# Patient Record
Sex: MALE | Race: WHITE | NOT HISPANIC OR LATINO | RURAL
[De-identification: names, ages, dates, MRNs, and addresses within clinical notes are randomized per-mention and may not be internally consistent; named-entity substitution may affect disease eponyms.]

---

## 2024-10-21 ENCOUNTER — HOSPITAL ENCOUNTER (EMERGENCY)
Facility: HOSPITAL | Age: 59
Discharge: HOME | End: 2024-10-21
Attending: EMERGENCY MEDICINE

## 2024-10-21 VITALS
DIASTOLIC BLOOD PRESSURE: 80 MMHG | HEART RATE: 55 BPM | SYSTOLIC BLOOD PRESSURE: 128 MMHG | WEIGHT: 164.88 LBS | HEIGHT: 72 IN | OXYGEN SATURATION: 99 % | TEMPERATURE: 98 F | RESPIRATION RATE: 17 BRPM | BODY MASS INDEX: 22.33 KG/M2

## 2024-10-21 DIAGNOSIS — M79.644 PAIN OF RIGHT THUMB: ICD-10-CM

## 2024-10-21 DIAGNOSIS — M19.90 ARTHRITIS: Primary | ICD-10-CM

## 2024-10-21 LAB
ERYTHROCYTE [SEDIMENTATION RATE] IN BLOOD BY WESTERGREN METHOD: 13 MM/HR (ref 0–20)
URATE SERPL-MCNC: 5.7 MG/DL (ref 3.5–7.2)

## 2024-10-21 PROCEDURE — 84550 ASSAY OF BLOOD/URIC ACID: CPT | Performed by: EMERGENCY MEDICINE

## 2024-10-21 PROCEDURE — 85651 RBC SED RATE NONAUTOMATED: CPT | Performed by: EMERGENCY MEDICINE

## 2024-10-21 PROCEDURE — 99284 EMERGENCY DEPT VISIT MOD MDM: CPT | Mod: ,,, | Performed by: EMERGENCY MEDICINE

## 2024-10-21 PROCEDURE — 99284 EMERGENCY DEPT VISIT MOD MDM: CPT | Mod: 25

## 2024-10-21 PROCEDURE — 36415 COLL VENOUS BLD VENIPUNCTURE: CPT | Performed by: EMERGENCY MEDICINE

## 2024-10-21 RX ORDER — ASPIRIN 81 MG/1
81 TABLET ORAL DAILY
COMMUNITY

## 2024-10-21 RX ORDER — CARVEDILOL 3.12 MG/1
3.12 TABLET ORAL 2 TIMES DAILY
COMMUNITY
Start: 2024-09-21

## 2024-10-21 RX ORDER — ATORVASTATIN CALCIUM 20 MG/1
20 TABLET, FILM COATED ORAL
COMMUNITY
Start: 2024-08-17

## 2024-10-21 RX ORDER — CLOPIDOGREL BISULFATE 75 MG/1
75 TABLET ORAL
COMMUNITY
Start: 2024-09-06

## 2024-10-21 RX ORDER — METHYLPREDNISOLONE 4 MG/1
TABLET ORAL
Qty: 21 EACH | Refills: 0 | Status: SHIPPED | OUTPATIENT
Start: 2024-10-21 | End: 2024-11-11

## 2024-10-21 RX ORDER — SPIRONOLACTONE 25 MG/1
12.5 TABLET ORAL
COMMUNITY
Start: 2024-10-05

## 2024-10-21 NOTE — DISCHARGE INSTRUCTIONS
ELEVATE  ALTERNATE ICE WITH HEAT AS TOLERATED  MEDICATIONS AS DIRECTED  MEDROL DOSE PACK  OVER THE COUNTER   TYLENOL, PEPCID FOR HEART BURN  LIMIT USE IF PAINFUL OF R THUMB  STOP SMOKING

## 2024-10-21 NOTE — ED PROVIDER NOTES
Encounter Date: 10/21/2024       History     Chief Complaint   Patient presents with    Joint Pain    Joint Swelling     C/O pain and swelling to base of right thumb onset this am, history of similar 2 weeks ago and resolved with ice and motrin po. Denies any known injury     PT IS A 59 YR OLD WM WITH 2 WEEK HX R THUMB PAIN, SWELLING INITIALLY RELATED TO USING TOOLS, PAIN CEASED WITH NSAID AND ICE THEN RECURRED LAST PM.  PT DENIES HX GOUT OR OTHER TRAUMA EXCEPT THAT AS ABOVE USING TOOLS.    PMH  Diagnosis Date Comments  Coronary artery disease [I25.10]      Past Surgical History   Procedure Laterality Date Age Comment Src.  MOUTH SURGERY       CORONARY ANGIOPLASTY WITH STENT PLACEMENT                         The history is provided by the patient.     Review of patient's allergies indicates:  No Known Allergies  Past Medical History:   Diagnosis Date    Coronary artery disease      Past Surgical History:   Procedure Laterality Date    CORONARY ANGIOPLASTY WITH STENT PLACEMENT      MOUTH SURGERY       No family history on file.  Social History     Tobacco Use    Smoking status: Every Day     Current packs/day: 2.00     Average packs/day: 2.0 packs/day for 41.8 years (83.6 ttl pk-yrs)     Types: Cigarettes     Start date: 1983     Passive exposure: Never    Smokeless tobacco: Never   Substance Use Topics    Alcohol use: Never    Drug use: Yes     Types: Marijuana     Review of Systems   Constitutional:  Negative for fever.   HENT:  Negative for sore throat.    Respiratory:  Negative for shortness of breath.    Cardiovascular:  Negative for chest pain.   Gastrointestinal:  Negative for nausea.   Genitourinary:  Negative for dysuria.   Musculoskeletal:  Positive for arthralgias. Negative for back pain.   Skin:  Negative for rash.   Neurological:  Negative for weakness.   Hematological:  Does not bruise/bleed easily.   All other systems reviewed and are negative.      Physical Exam     Initial Vitals [10/21/24 0900]   BP  Pulse Resp Temp SpO2   (!) 133/99 63 18 97.5 °F (36.4 °C) 98 %      MAP       --         Physical Exam    Nursing note and vitals reviewed.  Constitutional: He appears well-developed and well-nourished. He is cooperative. He appears distressed.   HENT:   Head: Normocephalic and atraumatic.   Eyes: Conjunctivae and EOM are normal. Pupils are equal, round, and reactive to light.   Neck: Trachea normal. Neck supple.   Normal range of motion.  Cardiovascular:  Normal rate, regular rhythm, normal heart sounds and intact distal pulses.           Pulses:       Radial pulses are 3+ on the right side and 3+ on the left side.        Dorsalis pedis pulses are 3+ on the right side and 3+ on the left side.   Pulmonary/Chest: Breath sounds normal. No respiratory distress. He has no wheezes.   Abdominal: Abdomen is soft. Bowel sounds are normal. There is no abdominal tenderness.   Musculoskeletal:         General: Tenderness (R THUMB DIFFUSELY) and edema (MINIMAL R THUMB BASE) present. Normal range of motion.      Right shoulder: Normal.      Left shoulder: Normal.      Right upper arm: Normal.      Left upper arm: Normal.      Right elbow: Normal.      Left elbow: Normal.      Right forearm: Normal.      Left forearm: Normal.      Right wrist: Normal.      Left wrist: Normal.      Right hand: Normal.      Left hand: Normal.      Cervical back: Normal range of motion and neck supple.      Comments: NO ERYTHEMA OR POINT TENDERNESS     Lymphadenopathy:     He has no cervical adenopathy.     He has no axillary adenopathy.   Neurological: He is alert and oriented to person, place, and time. He has normal strength. No cranial nerve deficit or sensory deficit. He displays a negative Romberg sign. GCS eye subscore is 4. GCS verbal subscore is 5. GCS motor subscore is 6.   Reflex Scores:       Brachioradialis reflexes are 2+ on the right side and 2+ on the left side.       Patellar reflexes are 2+ on the right side and 2+ on the left  side.  Skin: Skin is warm and dry. Capillary refill takes less than 2 seconds. No erythema.   Psychiatric: He has a normal mood and affect. His speech is normal and behavior is normal. Judgment and thought content normal. Cognition and memory are normal.         Medical Screening Exam   See Full Note    ED Course   Procedures  Labs Reviewed   SEDIMENTATION RATE, AUTOMATED - Normal       Result Value    ESR Westergren 13     URIC ACID - Normal    Uric Acid 5.7            Imaging Results              X-Ray Finger 2 or More Views Right (Final result)  Result time 10/21/24 09:54:03      Final result by Rad Yang MD (10/21/24 09:54:03)                   Impression:      No convincing evidence of acute fracture or dislocation.      Electronically signed by: Rad Yang  Date:    10/21/2024  Time:    09:54               Narrative:    EXAMINATION:  XR FINGER 2 OR MORE VIEWS RIGHT    CLINICAL HISTORY:  R THUMB PAIN;    TECHNIQUE:  Three views right thumb.    COMPARISON:  None    FINDINGS:  No convincing evidence of acute fracture or dislocation.  Joint spaces are maintained.  Moderate DJD of the thumb CMC joint.  No definite radiopaque foreign body.                                    X-Rays:   Independently Interpreted Readings:   Other Readings:    FINDINGS:  No convincing evidence of acute fracture or dislocation.  Joint spaces are maintained.  Moderate DJD of the thumb CMC joint.  No definite radiopaque foreign body.     Impression:     No convincing evidence of acute fracture or dislocation.        Electronically signed by:Rad Yang  Date:                                            10/21/2024  Time:                                           09:54      Medications - No data to display  Medical Decision Making  PT IS A 59 YR OLD WM WITH 2 WEEK HX R THUMB PAIN, SWELLING INITIALLY RELATED TO USING TOOLS, PAIN CEASED WITH NSAID AND ICE THEN RECURRED LAST PM.  PT DENIES HX GOUT OR OTHER TRAUMA EXCEPT THAT AS  ABOVE USING TOOLS.    PMH  Diagnosis Date Comments  Coronary artery disease [I25.10]      Past Surgical History   Procedure Laterality Date Age Comment Src.  MOUTH SURGERY       CORONARY ANGIOPLASTY WITH STENT PLACEMENT                   Amount and/or Complexity of Data Reviewed  Labs: ordered.     Details:      Updated   Order   10/21/24 1038  Sedimentation Rate  Collected: 10/21/24 0932  Final result  Specimen: Blood      ESR Westergren 13 mm/Hr       10/21/24 0945  Uric Acid  Collected: 10/21/24 0932  Final result  Specimen: Blood      Uric Acid 5.7 mg/dL              Radiology: ordered.     Details:   FINDINGS:  No convincing evidence of acute fracture or dislocation.  Joint spaces are maintained.  Moderate DJD of the thumb CMC joint.  No definite radiopaque foreign body.     Impression:     No convincing evidence of acute fracture or dislocation.        Electronically signed by:Rad Yang  Date:                                            10/21/2024  Time:                                           09:54    Discussion of management or test interpretation with external provider(s): EXAM  LABS NEG URIC ACID 5.7 AND SED RATE 13  XRAY MODERATE ARTHRITIC CHANGES R THUMB  DC HOME IN STABLE CONDITION    Risk  Prescription drug management.                                      Clinical Impression:   Final diagnoses:  [M19.90] Arthritis (Primary)  [M79.644] Pain of right thumb        ED Disposition Condition    Discharge Stable          ED Prescriptions       Medication Sig Dispense Start Date End Date Auth. Provider    methylPREDNISolone (MEDROL DOSEPACK) 4 mg tablet use as directed 21 each 10/21/2024 11/11/2024 Ann Monroy MD          Follow-up Information       Follow up With Specialties Details Why Contact Info    Marina Cisse MD Family Medicine In 1 week  [address]  [facility] [place] [address]  [phone]               Ann Monroy MD  10/21/24 [id number]

## 2024-10-23 ENCOUNTER — TELEPHONE (OUTPATIENT)
Dept: EMERGENCY MEDICINE | Facility: HOSPITAL | Age: 59
End: 2024-10-23

## 2025-01-24 ENCOUNTER — HOSPITAL ENCOUNTER (EMERGENCY)
Facility: HOSPITAL | Age: 60
Discharge: HOME | End: 2025-01-24

## 2025-01-24 VITALS
SYSTOLIC BLOOD PRESSURE: 154 MMHG | HEART RATE: 63 BPM | BODY MASS INDEX: 22.96 KG/M2 | HEIGHT: 69 IN | OXYGEN SATURATION: 98 % | DIASTOLIC BLOOD PRESSURE: 91 MMHG | WEIGHT: 155 LBS | TEMPERATURE: 98 F | RESPIRATION RATE: 18 BRPM

## 2025-01-24 DIAGNOSIS — M79.645 PAIN OF LEFT THUMB: Primary | ICD-10-CM

## 2025-01-24 DIAGNOSIS — M19.042 OSTEOARTHRITIS OF LEFT HAND, UNSPECIFIED OSTEOARTHRITIS TYPE: ICD-10-CM

## 2025-01-24 PROCEDURE — 99284 EMERGENCY DEPT VISIT MOD MDM: CPT | Mod: ,,,

## 2025-01-24 PROCEDURE — 99283 EMERGENCY DEPT VISIT LOW MDM: CPT | Mod: 25

## 2025-01-24 RX ORDER — METHYLPREDNISOLONE 4 MG/1
TABLET ORAL
Qty: 21 EACH | Refills: 0 | Status: SHIPPED | OUTPATIENT
Start: 2025-01-24 | End: 2025-02-14

## 2025-01-24 NOTE — ED PROVIDER NOTES
Encounter Date: 1/24/2025       History     Chief Complaint   Patient presents with    Hand Pain     Left hand pain/swelling since last night     59-year-old white male presents to ED complaining of left hand pain and swelling that started Monday 1/20/25 that has worsened.  Denies any known injury or trauma to the area.  He works as a .  Took ibuprofen 4 pills prior to arrival.  States he has tried ice as well with minimal relief. Denies fever, chills, SOB, CP, abd pain, N/V/D, or dysuria. PMH includes CAD and HPTN, Surgical history includes coronary angioplasty with stent placement.         The history is provided by the patient.     Review of patient's allergies indicates:  No Known Allergies  Past Medical History:   Diagnosis Date    Coronary artery disease      Past Surgical History:   Procedure Laterality Date    CORONARY ANGIOPLASTY WITH STENT PLACEMENT      MOUTH SURGERY       No family history on file.  Social History     Tobacco Use    Smoking status: Every Day     Current packs/day: 2.00     Average packs/day: 2.0 packs/day for 42.1 years (84.1 ttl pk-yrs)     Types: Cigarettes     Start date: 1983     Passive exposure: Never    Smokeless tobacco: Never   Substance Use Topics    Alcohol use: Never    Drug use: Yes     Types: Marijuana     Review of Systems   Constitutional:  Negative for chills and fever.   HENT:  Negative for congestion, rhinorrhea and sore throat.    Eyes:  Negative for visual disturbance.   Cardiovascular:  Negative for chest pain and palpitations.   Gastrointestinal:  Negative for abdominal pain, constipation, diarrhea, nausea and vomiting.   Genitourinary:  Negative for dysuria, flank pain, frequency and hematuria.   Musculoskeletal:  Negative for neck pain and neck stiffness.   Skin:  Negative for rash and wound.   Neurological:  Negative for weakness and headaches.   Psychiatric/Behavioral:  Negative for suicidal ideas.        Physical Exam     Initial Vitals [01/24/25 1143]    BP Pulse Resp Temp SpO2   (!) 154/91 63 18 97.9 °F (36.6 °C) 98 %      MAP       --         Physical Exam    Nursing note and vitals reviewed.  Constitutional: He appears well-developed and well-nourished. No distress.   HENT:   Head: Normocephalic.   Right Ear: External ear normal.   Left Ear: External ear normal. Mouth/Throat: Oropharynx is clear and moist. No oropharyngeal exudate.   Eyes: Conjunctivae and EOM are normal. Pupils are equal, round, and reactive to light. No scleral icterus.   Neck:   Normal range of motion.  Cardiovascular:  Normal rate, regular rhythm, normal heart sounds and intact distal pulses.           Pulmonary/Chest: Breath sounds normal. No respiratory distress. He has no wheezes.   Abdominal: Abdomen is soft. Bowel sounds are normal. He exhibits no distension. There is no abdominal tenderness.   Musculoskeletal:         General: Tenderness and edema present.      Cervical back: Normal range of motion.      Comments: Left thumb swelling (thenar) and pain; neurovascularly intact; pain worse with active and passive ROM     Lymphadenopathy:     He has no cervical adenopathy.   Neurological: He is alert and oriented to person, place, and time. He has normal strength. GCS score is 15. GCS eye subscore is 4. GCS verbal subscore is 5. GCS motor subscore is 6.   Skin: Skin is warm and dry. Capillary refill takes less than 2 seconds.   Psychiatric: He has a normal mood and affect.         Medical Screening Exam   See Full Note    ED Course   Procedures  Labs Reviewed - No data to display       Imaging Results              X-Ray Hand 3 view Left (Final result)  Result time 01/24/25 13:03:40      Final result by Albino Batista MD (01/24/25 13:03:40)                   Impression:      1.  Small (0.4 cm) irregular ossific density at lateral margin of trapezium without a visible fracture donor site and could represent an acute displaced acute avulsion fracture, chronic ununited displaced avulsion  fracture or degenerative soft tissue calcification and can be correlated with directed clinical exam.    2.  No dislocation or osseous destruction.    3.  Osteoarthritis:    *Moderate changes at trapezium-1st metacarpal joint with moderate joint narrowing and small marginal osteophytes.  *Mild osteoarthritis at DIP joints of fingers with mild joint narrowing and small marginal osteophytes.  4.  No significant soft tissue swelling identified.      Electronically signed by: Albino Batista  Date:    01/24/2025  Time:    13:03               Narrative:    EXAMINATION:  XR HAND COMPLETE, 3 VIEWS, LEFT    CLINICAL HISTORY:  Pain and swelling.    TECHNIQUE:  PA, lateral, oblique views of left hand obtained.    COMPARISON:  No past imaging of left hand at this time.                                       Medications - No data to display  Medical Decision Making  Isaac Sandra has no evidence of an open wound; fracture; dislocation; retained foreign body; nerve or vascular injury; compartment syndrome; septic joint or other infection.    In the presence of no known injury, his clinical presentation presents as arthritis and his xray is positive for osteoarthritis at the site for pain. He declined any injections and requested po steroid treatment that worked for osteoarthritis of his right thumb from previous visit.    Data Reviewed/Counseling: I have reviewed the patient's vital signs, nursing notes, and other relevant tests/information. I had a detailed discussion regarding the historical points, exam findings, and any diagnostic results supporting the discharge diagnosis. I also discussed the need for outpatient follow-up and the need to return to the ED if symptoms worsen or if there are any questions or concerns that arise at home.     Amount and/or Complexity of Data Reviewed  Independent Historian:      Details: 59-year-old white male presents to ED complaining of left hand pain and swelling that started Monday 1/20/25 that  has worsened.  Denies any known injury or trauma to the area.  He works as a .  Took ibuprofen 4 pills prior to arrival.  States he has tried ice as well with minimal relief. Denies fever, chills, SOB, CP, abd pain, N/V/D, or dysuria. PMH includes CAD and HPTN, Surgical history includes coronary angioplasty with stent placement.     Radiology: ordered.     Details: X-ray right hand three-view-  Small (0.4 cm) irregular ossific density at lateral margin of trapezium without a visible fracture donor site and could represent an acute displaced acute avulsion fracture, chronic ununited displaced avulsion fracture or degenerative soft tissue calcification and can be correlated with directed clinical exam. No dislocation or osseous destruction. Osteoarthritis: Moderate changes at trapezium-1st metacarpal joint with moderate joint narrowing and small marginal osteophytes. Mild osteoarthritis at DIP joints of fingers with mild joint narrowing and small marginal osteophytes. No significant soft tissue swelling identified.            Risk  Prescription drug management.                                      Clinical Impression:   Final diagnoses:  [M79.645] Pain of left thumb (Primary)  [M19.042] Osteoarthritis of left hand, unspecified osteoarthritis type        ED Disposition Condition    Discharge Stable          ED Prescriptions       Medication Sig Dispense Start Date End Date Auth. Provider    methylPREDNISolone (MEDROL DOSEPACK) 4 mg tablet use as directed 21 each 1/24/2025 2/14/2025 Catalina Bonner NP          Follow-up Information    None          Catalina Bonner NP  01/24/25 9495

## 2025-01-24 NOTE — DISCHARGE INSTRUCTIONS
Follow up with primary care provider in the next 2-3 days for re-evaluation.  Medrol Dosepak as prescribed.  Tylenol 650 mg every 4 hours and/or Motrin 600 mg every 8 hours for pain/swelling.  Rotate ice and heat to area 15 minutes every 2 hours when possible.  Keep elevated as much as possible.   Return to emergency department for any future emergencies.

## 2025-01-25 ENCOUNTER — TELEPHONE (OUTPATIENT)
Dept: EMERGENCY MEDICINE | Facility: HOSPITAL | Age: 60
End: 2025-01-25